# Patient Record
Sex: MALE | Race: WHITE
[De-identification: names, ages, dates, MRNs, and addresses within clinical notes are randomized per-mention and may not be internally consistent; named-entity substitution may affect disease eponyms.]

---

## 2021-01-28 NOTE — NUR
CC OF LEFT SIDE HEADACHE PROGRESSING INTO LEFT SIDE NUMBNESS AND TINGLING IN 
ARM AND LEG FOR A FEW WEEKS. PT DOES HAVE HX OF HTN AND IS ON BP MEDS AND 
CLAIMS HE MIGHT BE PRE-DIABETIC. GIRLFRIEND AT BEDSIDE.

-------------------------------------------------------------------------------

Addendum: 01/28/21 at 1647 by TURNER

-------------------------------------------------------------------------------

CC OF LEFT SIDE HEADACHE FOR FEW WEEKS PROGRESSING INTO LEFT SIDE NUMBNESS AND 
TINGLING IN ARM AND LEG STARTING LAST NIGHT. PT DOES HAVE HX OF HTN AND IS ON 
BP MEDS AND CLAIMS HE MIGHT BE PRE-DIABETIC. GIRLFRIEND AT BEDSIDE.

## 2021-08-22 ENCOUNTER — HOSPITAL ENCOUNTER (EMERGENCY)
Dept: HOSPITAL 8 - ED | Age: 46
Discharge: HOME | End: 2021-08-22
Payer: COMMERCIAL

## 2021-08-22 VITALS — BODY MASS INDEX: 32.93 KG/M2 | HEIGHT: 71 IN | WEIGHT: 235.23 LBS

## 2021-08-22 VITALS — DIASTOLIC BLOOD PRESSURE: 76 MMHG | SYSTOLIC BLOOD PRESSURE: 139 MMHG

## 2021-08-22 DIAGNOSIS — R04.0: Primary | ICD-10-CM

## 2021-08-22 DIAGNOSIS — I10: ICD-10-CM

## 2021-08-22 DIAGNOSIS — R00.0: ICD-10-CM

## 2021-08-22 LAB
ALBUMIN SERPL-MCNC: 4.2 G/DL (ref 3.4–5)
ALP SERPL-CCNC: 76 U/L (ref 45–117)
ALT SERPL-CCNC: 57 U/L (ref 12–78)
ANION GAP SERPL CALC-SCNC: 10 MMOL/L (ref 5–15)
BASOPHILS # BLD AUTO: 0.1 X10^3/UL (ref 0–0.1)
BASOPHILS # BLD AUTO: 0.1 X10^3/UL (ref 0–0.1)
BASOPHILS NFR BLD AUTO: 1 % (ref 0–1)
BASOPHILS NFR BLD AUTO: 1 % (ref 0–1)
BILIRUB SERPL-MCNC: 0.6 MG/DL (ref 0.2–1)
CALCIUM SERPL-MCNC: 10.2 MG/DL (ref 8.5–10.1)
CHLORIDE SERPL-SCNC: 102 MMOL/L (ref 98–107)
CREAT SERPL-MCNC: 1.63 MG/DL (ref 0.7–1.3)
EOSINOPHIL # BLD AUTO: 0.1 X10^3/UL (ref 0–0.4)
EOSINOPHIL # BLD AUTO: 0.1 X10^3/UL (ref 0–0.4)
EOSINOPHIL NFR BLD AUTO: 0 % (ref 1–7)
EOSINOPHIL NFR BLD AUTO: 0 % (ref 1–7)
ERYTHROCYTE [DISTWIDTH] IN BLOOD BY AUTOMATED COUNT: 13.2 % (ref 9.4–14.8)
ERYTHROCYTE [DISTWIDTH] IN BLOOD BY AUTOMATED COUNT: 13.6 % (ref 9.4–14.8)
LYMPHOCYTES # BLD AUTO: 2.1 X10^3/UL (ref 1–3.4)
LYMPHOCYTES # BLD AUTO: 3 X10^3/UL (ref 1–3.4)
LYMPHOCYTES NFR BLD AUTO: 17 % (ref 22–44)
LYMPHOCYTES NFR BLD AUTO: 17 % (ref 22–44)
MCH RBC QN AUTO: 29.7 PG (ref 27.5–34.5)
MCH RBC QN AUTO: 30.1 PG (ref 27.5–34.5)
MCHC RBC AUTO-ENTMCNC: 34.3 G/DL (ref 33.2–36.2)
MCHC RBC AUTO-ENTMCNC: 34.5 G/DL (ref 33.2–36.2)
MONOCYTES # BLD AUTO: 0.8 X10^3/UL (ref 0.2–0.8)
MONOCYTES # BLD AUTO: 1.1 X10^3/UL (ref 0.2–0.8)
MONOCYTES NFR BLD AUTO: 6 % (ref 2–9)
MONOCYTES NFR BLD AUTO: 7 % (ref 2–9)
NEUTROPHILS # BLD AUTO: 13.6 X10^3/UL (ref 1.8–6.8)
NEUTROPHILS # BLD AUTO: 9.2 X10^3/UL (ref 1.8–6.8)
NEUTROPHILS NFR BLD AUTO: 75 % (ref 42–75)
NEUTROPHILS NFR BLD AUTO: 76 % (ref 42–75)
PLATELET # BLD AUTO: 517 X10^3/UL (ref 130–400)
PLATELET # BLD AUTO: 542 X10^3/UL (ref 130–400)
PMV BLD AUTO: 7.4 FL (ref 7.4–10.4)
PMV BLD AUTO: 7.6 FL (ref 7.4–10.4)
PROT SERPL-MCNC: 8.6 G/DL (ref 6.4–8.2)
RBC # BLD AUTO: 4.86 X10^6/UL (ref 4.38–5.82)
RBC # BLD AUTO: 5.08 X10^6/UL (ref 4.38–5.82)

## 2021-08-22 PROCEDURE — 36415 COLL VENOUS BLD VENIPUNCTURE: CPT

## 2021-08-22 PROCEDURE — 86850 RBC ANTIBODY SCREEN: CPT

## 2021-08-22 PROCEDURE — 80053 COMPREHEN METABOLIC PANEL: CPT

## 2021-08-22 PROCEDURE — 96360 HYDRATION IV INFUSION INIT: CPT

## 2021-08-22 PROCEDURE — 99285 EMERGENCY DEPT VISIT HI MDM: CPT

## 2021-08-22 PROCEDURE — 85025 COMPLETE CBC W/AUTO DIFF WBC: CPT

## 2021-08-22 PROCEDURE — 86900 BLOOD TYPING SEROLOGIC ABO: CPT

## 2021-08-22 PROCEDURE — 93005 ELECTROCARDIOGRAM TRACING: CPT

## 2021-08-22 PROCEDURE — 30901 CONTROL OF NOSEBLEED: CPT

## 2021-08-22 NOTE — NUR
PT CONTINUES STO REST CALMLY IN BED, DOZING OFF AND ON.  NO STATED NEEDS AT 
THIS TIME.  PT VSS WITH .  ORDERED FLUIDS FINISHED.  WILL CONTINUE TO 
MONITOR.

## 2021-08-22 NOTE — NUR
PT BECAME PALE AND TACHYCARDIAC.  PT MOVED FROM ROOM 20 TO TRAUMA 4.  IVs BEING 
PLACED AT T HIS TIME.  BEDSIDE REPORT GIVEN BY STEPH FRANCOIS.  PT A&OX4, PLACED IN 
HOSPITAL GOWN.  PT ON CARDIAC AND VITALS MONITORS. WILL CONTINUE TO MONITOR.

## 2021-08-22 NOTE — NUR
-------------------------------------------------------------------------------

            *** Note undone in [facility] - 08/22/21 at 0907 by ENRRIQUE ***            

-------------------------------------------------------------------------------

CHARGE RN: DISCUSSED PT WITH SELINA HAILE.

## 2021-08-22 NOTE — NUR
PT RESTING CALMLY IN BED AT THIS TIME.  ORDERED FLUIDS INFUSING.  LAB HAS DRAWN 
BLOOD.  WILL CONTINUE TO MONITOR.

## 2021-08-23 ENCOUNTER — HOSPITAL ENCOUNTER (EMERGENCY)
Dept: HOSPITAL 8 - ED | Age: 46
Discharge: HOME | End: 2021-08-23
Payer: COMMERCIAL

## 2021-08-23 VITALS — BODY MASS INDEX: 32.72 KG/M2 | HEIGHT: 71 IN | WEIGHT: 233.69 LBS

## 2021-08-23 VITALS — SYSTOLIC BLOOD PRESSURE: 110 MMHG | DIASTOLIC BLOOD PRESSURE: 83 MMHG

## 2021-08-23 DIAGNOSIS — J34.89: ICD-10-CM

## 2021-08-23 DIAGNOSIS — R04.0: Primary | ICD-10-CM

## 2021-08-23 DIAGNOSIS — I10: ICD-10-CM

## 2021-08-23 PROCEDURE — 99283 EMERGENCY DEPT VISIT LOW MDM: CPT

## 2021-08-23 NOTE — NUR
pt medicated per order, dc instructions reviewed, pt verbalized undertsanding. 
ambulatory to dc desk with steady gait.